# Patient Record
Sex: FEMALE | Race: AMERICAN INDIAN OR ALASKA NATIVE
[De-identification: names, ages, dates, MRNs, and addresses within clinical notes are randomized per-mention and may not be internally consistent; named-entity substitution may affect disease eponyms.]

---

## 2017-11-21 ENCOUNTER — HOSPITAL ENCOUNTER (OUTPATIENT)
Dept: HOSPITAL 5 - TRG | Age: 42
Discharge: HOME | End: 2017-11-21
Attending: OBSTETRICS & GYNECOLOGY
Payer: COMMERCIAL

## 2017-11-21 VITALS — SYSTOLIC BLOOD PRESSURE: 122 MMHG | DIASTOLIC BLOOD PRESSURE: 79 MMHG

## 2017-11-21 DIAGNOSIS — Z3A.38: ICD-10-CM

## 2017-11-21 DIAGNOSIS — O09.523: Primary | ICD-10-CM

## 2017-11-21 LAB
BILIRUB UR QL STRIP: (no result)
BLOOD UR QL VISUAL: (no result)
HCT VFR BLD CALC: 29.3 % (ref 30.3–42.9)
HGB BLD-MCNC: 8.9 GM/DL (ref 10.1–14.3)
KETONES UR STRIP-MCNC: (no result) MG/DL
LEUKOCYTE ESTERASE UR QL STRIP: (no result)
MCH RBC QN AUTO: 19 PG (ref 28–32)
MCHC RBC AUTO-ENTMCNC: 30 % (ref 30–34)
MCV RBC AUTO: 62 FL (ref 79–97)
MUCOUS THREADS #/AREA URNS HPF: (no result) /HPF
NITRITE UR QL STRIP: (no result)
PH UR STRIP: 7 [PH] (ref 5–7)
PLATELET # BLD: 265 K/MM3 (ref 140–440)
PROT UR STRIP-MCNC: (no result) MG/DL
RBC # BLD AUTO: 4.73 M/MM3 (ref 3.65–5.03)
RBC #/AREA URNS HPF: 2 /HPF (ref 0–6)
URATE SERPL-MCNC: 4.2 MG/DL (ref 3.5–7.6)
UROBILINOGEN UR-MCNC: < 2 MG/DL (ref ?–2)
WBC # BLD AUTO: 7.5 K/MM3 (ref 4.5–11)
WBC #/AREA URNS HPF: 2 /HPF (ref 0–6)

## 2017-11-21 PROCEDURE — 84450 TRANSFERASE (AST) (SGOT): CPT

## 2017-11-21 PROCEDURE — 59025 FETAL NON-STRESS TEST: CPT

## 2017-11-21 PROCEDURE — 82565 ASSAY OF CREATININE: CPT

## 2017-11-21 PROCEDURE — 83615 LACTATE (LD) (LDH) ENZYME: CPT

## 2017-11-21 PROCEDURE — 84460 ALANINE AMINO (ALT) (SGPT): CPT

## 2017-11-21 PROCEDURE — 85027 COMPLETE CBC AUTOMATED: CPT

## 2017-11-21 PROCEDURE — 81001 URINALYSIS AUTO W/SCOPE: CPT

## 2017-11-21 PROCEDURE — 36415 COLL VENOUS BLD VENIPUNCTURE: CPT

## 2017-11-21 PROCEDURE — 84550 ASSAY OF BLOOD/URIC ACID: CPT

## 2017-12-04 ENCOUNTER — HOSPITAL ENCOUNTER (OUTPATIENT)
Dept: HOSPITAL 5 - TRG | Age: 42
Discharge: HOME | End: 2017-12-04
Attending: OBSTETRICS & GYNECOLOGY
Payer: COMMERCIAL

## 2017-12-04 VITALS — DIASTOLIC BLOOD PRESSURE: 73 MMHG | SYSTOLIC BLOOD PRESSURE: 120 MMHG

## 2017-12-04 DIAGNOSIS — O48.0: ICD-10-CM

## 2017-12-04 DIAGNOSIS — Z3A.40: ICD-10-CM

## 2017-12-04 DIAGNOSIS — O09.523: Primary | ICD-10-CM

## 2017-12-05 ENCOUNTER — HOSPITAL ENCOUNTER (INPATIENT)
Dept: HOSPITAL 5 - APU | Age: 42
LOS: 2 days | Discharge: HOME | End: 2017-12-07
Attending: OBSTETRICS & GYNECOLOGY | Admitting: OBSTETRICS & GYNECOLOGY
Payer: COMMERCIAL

## 2017-12-05 DIAGNOSIS — Z23: ICD-10-CM

## 2017-12-05 DIAGNOSIS — D25.9: ICD-10-CM

## 2017-12-05 DIAGNOSIS — D62: ICD-10-CM

## 2017-12-05 DIAGNOSIS — Z3A.40: ICD-10-CM

## 2017-12-05 DIAGNOSIS — O34.211: Primary | ICD-10-CM

## 2017-12-05 DIAGNOSIS — O34.13: ICD-10-CM

## 2017-12-05 LAB
BASOPHILS NFR BLD AUTO: 0.5 % (ref 0–1.8)
EOSINOPHIL NFR BLD AUTO: 0.7 % (ref 0–4.3)
HCT VFR BLD CALC: 30 % (ref 30.3–42.9)
HGB BLD-MCNC: 9 GM/DL (ref 10.1–14.3)
MCH RBC QN AUTO: 19 PG (ref 28–32)
MCHC RBC AUTO-ENTMCNC: 30 % (ref 30–34)
MCV RBC AUTO: 62 FL (ref 79–97)
PLATELET # BLD: 246 K/MM3 (ref 140–440)
RBC # BLD AUTO: 4.84 M/MM3 (ref 3.65–5.03)
WBC # BLD AUTO: 8.2 K/MM3 (ref 4.5–11)

## 2017-12-05 PROCEDURE — 90471 IMMUNIZATION ADMIN: CPT

## 2017-12-05 PROCEDURE — 59025 FETAL NON-STRESS TEST: CPT

## 2017-12-05 PROCEDURE — 86900 BLOOD TYPING SEROLOGIC ABO: CPT

## 2017-12-05 PROCEDURE — 99211 OFF/OP EST MAY X REQ PHY/QHP: CPT

## 2017-12-05 PROCEDURE — G0463 HOSPITAL OUTPT CLINIC VISIT: HCPCS

## 2017-12-05 PROCEDURE — 36415 COLL VENOUS BLD VENIPUNCTURE: CPT

## 2017-12-05 PROCEDURE — 90715 TDAP VACCINE 7 YRS/> IM: CPT

## 2017-12-05 PROCEDURE — 85018 HEMOGLOBIN: CPT

## 2017-12-05 PROCEDURE — 86901 BLOOD TYPING SEROLOGIC RH(D): CPT

## 2017-12-05 PROCEDURE — 85014 HEMATOCRIT: CPT

## 2017-12-05 PROCEDURE — 86850 RBC ANTIBODY SCREEN: CPT

## 2017-12-05 PROCEDURE — 85025 COMPLETE CBC W/AUTO DIFF WBC: CPT

## 2017-12-05 PROCEDURE — 96360 HYDRATION IV INFUSION INIT: CPT

## 2017-12-05 PROCEDURE — C9250 ARTISS FIBRIN SEALANT: HCPCS

## 2017-12-05 RX ADMIN — CEFAZOLIN SCH MLS/10 MIN: 10 INJECTION, POWDER, FOR SOLUTION INTRAVENOUS at 17:35

## 2017-12-05 RX ADMIN — SODIUM CHLORIDE, SODIUM LACTATE, POTASSIUM CHLORIDE, AND CALCIUM CHLORIDE SCH MLS/HR: .6; .31; .03; .02 INJECTION, SOLUTION INTRAVENOUS at 11:42

## 2017-12-05 RX ADMIN — SODIUM CHLORIDE, SODIUM LACTATE, POTASSIUM CHLORIDE, AND CALCIUM CHLORIDE SCH MLS/HR: .6; .31; .03; .02 INJECTION, SOLUTION INTRAVENOUS at 10:52

## 2017-12-05 NOTE — HISTORY AND PHYSICAL REPORT
History of Present Illness


Date of examination: 17


Date of admission: 


17 09:48





Chief complaint: 





Repeat C Section with BTL


History of present illness: 





Pt is a 43yo BF  EDC 17;  EGA 40 3/7 weeks presents for a Repeat C 

Section with BTL.  She received prenatal care at [facility] since 

11 weeks and  course has been complicated by uterine fibroids for 

which she see's APA,and previous C Section x 1.  Prenatal records are available 

and GBS is Negative.





Past History


Past Medical History: no pertinent history


Past Surgical History:  section


GYN History: fibroids


Social history: no significant social history, 





- Obstetrical History


Expected Date of Delivery: 17


Actual Gestation: 40 Week(s) 3 Day(s) 


: 3





Medications and Allergies


 Allergies











Allergy/AdvReac Type Severity Reaction Status Date / Time


 


No Known Allergies Allergy   Verified 17 10:09











 Home Medications











 Medication  Instructions  Recorded  Confirmed  Last Taken  Type


 


No Known Home Medications [No  17 Unknown History





Reported Home Medications]     











Active Meds: 


Active Medications





Citric Acid/Sodium Citrate (Bicitra)  30 ml PO ONCE NR


   Stop: 17 19:00


   Last Admin: 17 11:43 Dose:  30 ml


Diphenhydramine HCl (Benadryl)  12.5 mg IV Q2H PRN


   PRN Reason: Itching


Famotidine (Pepcid)  20 mg IV ONCE NR


   Stop: 17 19:00


   Last Admin: 17 11:43 Dose:  20 mg


Cefazolin Sodium (Ancef/Sterile Water 2 Gm/20 Ml)  2 gm in 20 mls @ 80 mls/hr 

IV PREOP NR


   PRN Reason: Protocol


   Stop: 17 19:00


Lactated Ringer's (Lactated Ringers)  1,000 mls @ 2,250 mls/hr IV PREOP MILAN


   Stop: 17 11:27


   Last Admin: 17 11:42 Dose:  2,250 mls/hr


Oxytocin/Sodium Chloride (Pitocin/Ns 20 Unit/1000ml Drip)  20 units in 1,000 

mls @ 0 mls/hr IV TITR MILAN


   PRN Reason: As Directed


Ketorolac Tromethamine (Toradol)  30 mg IV Q6H PRN


   PRN Reason: Pain, Moderate (4-6)


   Stop: 12/10/17 10:52


Metoclopramide HCl (Reglan)  10 mg IV ONCE NR


   Stop: 17 19:00


   Last Admin: 17 11:43 Dose:  10 mg


Morphine Sulfate (Morphine)  2.5 mg IV Q15M PRN


   PRN Reason: Breakthrough Pain


Naloxone HCl (Narcan 0.4 Mg/1 Ml)  0.2 mg IV Q2MIN PRN


   PRN Reason: Res Rate </= 8 or 02 SAT < 92%


Ondansetron HCl (Zofran)  4 mg IV Q8H PRN


   PRN Reason: Nausea And Vomiting


Promethazine HCl (Phenergan)  25 mg AL Q6H PRN


   PRN Reason: Nausea And Vomiting


Sodium Chloride (Sodium Chloride Flush Syringe 10 Ml)  10 ml IV PRN MILAN











Review of Systems


All systems: negative





- Vital Signs


Vital signs: 


 Vital Signs











Temp Pulse Resp Pulse Ox


 


 98.2 F   87   20   99 


 


 17 10:00  17 10:00  17 10:00  17 10:00








 











Temp Pulse Resp BP Pulse Ox


 


 98.2 F   83   20   136/85   99 


 


 17 10:00  17 10:51  17 10:00  17 10:51  17 10:47














- Physical Exam


Breasts: Positive: deferred


Cardiovascular: Regular rate


Lungs: Positive: Clear to auscultation


Abdomen: Positive: normal appearance


Genitourinary (Female): Positive: normal external genitalia


Uterus: Positive: enlarged


Extremities: Positive: normal





- Obstetrical


FHR: category 1


Uterine Contraction Monitor Mode: External





Results


Result Diagrams: 


 17 10:00





 Abnormal lab results











  17 Range/Units





  10:00 


 


Hgb  9.0 L  (10.1-14.3)  gm/dl


 


Hct  30.0 L  (30.3-42.9)  %


 


MCV  62 L  (79-97)  fl


 


MCH  19 L  (28-32)  pg


 


RDW  22.1 H  (13.2-15.2)  %


 


Mono % (Auto)  8.6 H  (0.0-7.3)  %








All other labs normal.

## 2017-12-05 NOTE — ANESTHESIA CONSULTATION
Anesthesia Consult and Med Hx


Date of service: 12/05/17





- Airway


Anesthetic Teeth Evaluation: Good


ROM Head & Neck: Adequate


Mental/Hyoid Distance: Adequate


Mallampati Class: Class II


Intubation Access Assessment: Probably Good





- Pre-Operative Health Status


ASA Pre-Surgery Classification: ASA2


Proposed Anesthetic Plan: Epidural, Spinal





- Pulmonary


Hx Asthma: No


COPD: No


Hx Pneumonia: No





- Cardiovascular System


Hx Hypertension: No





- Central Nervous System


Hx Seizures: No


Hx Psychiatric Problems: No





- Endocrine


Hx Renal Disease: No


Hx End Stage Renal Disease: No


Hx Hypothyroidism: No


Hx Hyperthyroidism: No





- Hematic


Hx Anemia: Yes


Hx Sickle Cell Disease: No





- Other Systems


Hx Alcohol Use: No

## 2017-12-05 NOTE — OPERATIVE REPORT
Operative Report


Operative Report: 


Date of procedure: 2017





Pre-operative diagnosis: 1.  Intrauterine pregnancy at 40-3/7 weeks   2.  

Previous    3.  Advanced maternal age   4.  Uterine fibroids





Post-operative diagnosis: Same





Procedure name(s): Repeat low transverse  section





Surgeon: Keith Trinidad MD





Assistant: None





Anesthesia: Spinal anesthesia by Dr. Paredes





EBL: 800 mL's





Findings: A 4208 g male infant Apgars 8 at 1 minute 9 at 5 minutes.  Clear 

amniotic fluid.  Normal uterus with uterine fibroids.  Normal tubes and ovaries 

bilaterally.





Procedure: After the patient was prepped and draped in usual sterile fashion, 

and after satisfactory level of epidural anesthesia was obtained, the skin 

knife was used to make a transverse skin incision through the previous skin 

scar.  The incision was excised down to layer of the fascia, which was nicked 

in the midline and extended laterally using the Bovie cautery.  The rectus 

muscles were dissected off the rectus fascia both superiorly and inferiorly.  

The rectus bellies  in the midline, and the peritoneum was entered 

under direct visualization.  The peritoneal incision was extended superiorly 

and inferiorly.  A bladder flap was created and the bladder blade was then 

placed.  The uterus was scored in a curvilinear linear fashion, entered in the 

midline revealing clear amniotic fluid.  The infant's head was delivered onto 

the surgical field, and the oropharynx and nasopharynx were bulb suctioned.  

The rest of the infant's body was delivered, cord was doubly clamped and cut 

and the infant was handed to the waiting respiratory team.  The placenta was 

manually removed from the uterus, and the uterus removed from its normal 

anatomical position.  After gentle uterine lavage, the incision was inspected 

and found to be without extensions.  It was then closed in 2 layers using 0 

Vicryl suture in a running interlocking fashion, the second layer imbricating 

the first.  After good hemostasis was achieved, copious amounts or irrigation 

was performed, and the gutters were suctioned free of blood and blood clots.  

The Tisseel sealant was sprayed across the uterine incision.  The uterus was 

then returned to its normal anatomical position, and after excellent hemostasis 

assured, the peritoneum was re-approximated using 3-0 Vicryl suture in a 

running interlocking fashion, and then the rectus muscles were re-approximated 

using 3-0 Vicryl suture in a figure-of-eight configuration.  The fascia was 

then re-approximated using 0 Vicryl suture in running interlocking fashion.  

The subcutaneous layer was made hemostatic using Bovie cautery, the Tisseel 

sealant was sprayed across the fascial incision and the skin edges re-

approximated using 4-0 Vicryl suture in a sub-cuticular fashion.  Patient 

tolerated the procedure well was transported to recovery in stable condition.

## 2017-12-06 LAB
HCT VFR BLD CALC: 25.4 % (ref 30.3–42.9)
HGB BLD-MCNC: 7.8 GM/DL (ref 10.1–14.3)

## 2017-12-06 PROCEDURE — 3E0234Z INTRODUCTION OF SERUM, TOXOID AND VACCINE INTO MUSCLE, PERCUTANEOUS APPROACH: ICD-10-PCS | Performed by: OBSTETRICS & GYNECOLOGY

## 2017-12-06 RX ADMIN — CEFAZOLIN SCH MLS/10 MIN: 10 INJECTION, POWDER, FOR SOLUTION INTRAVENOUS at 01:35

## 2017-12-06 RX ADMIN — FERROUS SULFATE TAB 325 MG (65 MG ELEMENTAL FE) SCH: 325 (65 FE) TAB at 10:00

## 2017-12-06 RX ADMIN — Medication SCH: at 10:00

## 2017-12-06 NOTE — PROGRESS NOTE
Assessment and Plan





- Patient Problems


(1) Status post repeat low transverse  section


Onset Date: 17   Current Visit: Yes   Status: Resolved   


Plan to address problem: 


A:  S/P Repeat C Section - POD #1  Doing well


      Acute blood loss anemia - stable





 P:  Continue RPOC


      Anticipate discharge in 24-48hrs








(2) Acute blood loss anemia


Onset Date: 17   Current Visit: Yes   Status: Resolved   





Subjective





- Subjective


Date of service: 17


Principal diagnosis: s/p Repeat C Section - POD #1


Interval history: 





Pt is feeling well without complaints, tolerating a liquid diet without nausea 

or vomiting.


Patient reports: appetite normal, voiding normally, pain well controlled, flatus

, ambulating normally


[place]: doing well, nursing well





Objective





- Vital Signs


Latest vital signs: 


 Vital Signs











  Temp Pulse Resp BP BP Pulse Ox


 


 17 04:45  98.5 F  114 H  20   137/79  94


 


 17 00:40  99.4 F  116 H  20   131/77  97


 


 17 14:53  97.9 F  91 H  18  117/72   97


 


 17 14:30  97.6 F     


 


 17 14:25   100 H  13  120/77   98


 


 17 14:19   109 H  14  140/82   98


 


 17 14:13   86  16  115/69   100


 


 17 14:07   80  13  112/67   97


 


 17 14:01   85  12  111/65   97


 


 17 14:00  97.5 F L  101 H  16  118/68  


 


 17 13:27  97.0 F L   16  108/58   98


 


 17 10:51   83   136/85  


 


 17 10:50   90   140/89  


 


 17 10:47   92 H     99


 


 17 10:42   88     99


 


 17 10:37   90     100


 


 17 10:32   91 H     100


 


 17 10:27   95 H     99


 


 17 10:22   89     99


 


 17 10:17   84     100


 


 17 10:12   87     99


 


 17 10:08   85   136/86  


 


 17 10:07   93 H     99


 


 17 10:00  98.2 F  87  20    99








 Intake and Output











 17





 22:59 06:59 14:59


 


Intake Total  240 


 


Output Total  950 


 


Balance  -710 


 


Intake:   


 


  Oral  240 


 


Output:   


 


  Urine  950 


 


    Indwelling Catheter  500 


 


    Void  450 


 


Other:   


 


  Total, Intake Amount  120 


 


  Total, Output Amount  450 














- Exam


Breasts: Present: deferred


Cardiovascular: Present: Regular rate


Lungs: Present: Clear to auscultation


Abdomen: Present: normal appearance


Uterus: Present: normal, firm, fundal height below umbilicus


Extremities: Present: normal


Incision: Present: normal, dry, intact, dressed





- Labs


Labs: 


 Abnormal lab results











  17 Range/Units





  10:00 01:06 


 


Hgb  9.0 L  7.8 L  (10.1-14.3)  gm/dl


 


Hct  30.0 L  25.4 L  (30.3-42.9)  %


 


MCV  62 L   (79-97)  fl


 


MCH  19 L   (28-32)  pg


 


RDW  22.1 H   (13.2-15.2)  %


 


Mono % (Auto)  8.6 H   (0.0-7.3)  %








 Laboratory Tests











  17





  10:00 10:00 01:06


 


WBC  8.2  


 


RBC  4.84  


 


Hgb  9.0 L   7.8 L


 


Hct  30.0 L   25.4 L


 


MCV  62 L  


 


MCH  19 L  


 


MCHC  30  


 


RDW  22.1 H  


 


Plt Count  246  


 


Lymph % (Auto)  28.5  


 


Mono % (Auto)  8.6 H  


 


Eos % (Auto)  0.7  


 


Baso % (Auto)  0.5  


 


Lymph #  2.3  


 


Mono #  0.7  


 


Eos #  0.1  


 


Baso #  0.0  


 


Seg Neutrophils %  61.7  


 


Seg Neutrophils #  5.1  


 


Blood Type   A POSITIVE 


 


Antibody Screen   Negative

## 2017-12-06 NOTE — PROGRESS NOTE
Subjective


Date of service: 17


Principal diagnosis: s/p Repeat C Section - POD #1


Interval history: 





1st POD after 


Patient is in the bed, comfortable. Pain is well controlled with pain meds. 

Ambulated well. No residual neurological deficit. No anesthesia complications








Objective





- Constitutional


Vitals: 


 Vital Signs - 12hr











  17





  04:44 04:45 08:31


 


Temperature  98.5 F 99.8 F H


 


Pulse Rate 116 H 114 H 111 H


 


Respiratory  20 18





Rate   


 


Blood Pressure 137/79  130/72


 


Blood Pressure  137/79 





[Left]   


 


O2 Sat by Pulse 94 94 97





Oximetry   














- Labs


CBC & Chem 7: 


 17 01:06





Labs: 


 Abnormal lab results











  17 Range/Units





  01:06 


 


Hgb  7.8 L  (10.1-14.3)  gm/dl


 


Hct  25.4 L  (30.3-42.9)  %

## 2017-12-07 VITALS — SYSTOLIC BLOOD PRESSURE: 128 MMHG | DIASTOLIC BLOOD PRESSURE: 76 MMHG

## 2017-12-07 RX ADMIN — Medication SCH EACH: at 12:40

## 2017-12-07 RX ADMIN — FERROUS SULFATE TAB 325 MG (65 MG ELEMENTAL FE) SCH MG: 325 (65 FE) TAB at 12:40

## 2017-12-07 NOTE — DISCHARGE SUMMARY
Providers





- Providers


Date of Admission: 


17 09:48





Date of discharge: 17


Attending physician: 


KATIE RAWLS





Primary care physician: 


SHARIF ROCA








Hospitalization


Reason for admission:  section, IUP at term


Delivery: 


Procedure:  section, repeat low transverse


Episiotomy: none


Laceration: none


Incision: normal, dry, intact


Other postpartum procedures: none


Postpartum complications: none


Discharge diagnosis: IUP at term delivered


 baby: male


Hospital course: 





Pt is a 41yo BF  EDC 17;  EGA 40 3/7 weeks who presented for a Repeat 

C Section.  She underwent an uncomplicated Repeat C Section which she tolerated 

well, and by POD #2 she was tolerating a reg diet without nausea or vomiting, 

ambulating and voiding without difficulty. She was therefore discharged to home 

on POD #2 in stable condition.


Condition at discharge: Good


Disposition: DC-01 TO HOME OR SELFCARE





- Discharge Diagnoses


(1) Status post repeat low transverse  section


Status: Resolved   





(2) Acute blood loss anemia


Status: Resolved   





Plan





- Discharge Medications


Prescriptions: 


Ferrous Sulfate [Feosol 325 MG tab] 325 mg PO BID #60 tablet


HYDROcodone/APAP 5-325 [[place] 5/325] 1 each PO Q6HR PRN #30 tablet


 PRN Reason: Pain


Ibuprofen [Motrin] 800 mg PO Q8HR PRN #30 tablet


 PRN Reason: Moder Pain Unrelieved By Norco


Prenatal Vit Calc,Iron,Folic [Prenatal Vitamins] 1 each PO DAILY #30 tablet





- Provider Discharge Summary


Activity: routine, no sex for 6 weeks, no heavy lifting 4 weeks, no strenuous 

exercise


Diet: routine


Instructions: routine


Additional instructions: 


[]  Smoking cessation referral if applicable(refer to patient education folder 

for contact #)


[]  Refer to [facility] Booklet








Call your doctor immediately for:


* Fever > 100.5


* Heavy vaginal bleeding ( >1 pad per hour)


* Severe persistent headache


* Shortness of breath


* Reddened, hot, painful area to leg or breast


* Drainage or odor from incision.





* Keep incision clean and dry at all times and follow doctor's instructions 

regarding bathing/showering











- Follow up plan


Follow up: 


SHARIF ROCA MD [Primary Care Provider] - 14 Days

## 2017-12-07 NOTE — PROGRESS NOTE
Assessment and Plan





- Patient Problems


(1) Status post repeat low transverse  section


Onset Date: 17   Current Visit: Yes   Status: Resolved   


Plan to address problem: 


A:  S/P Repeat C Section - POD #2  Doing well


      Acute blood loss anemia - stable





 P:  May go home today.








(2) Acute blood loss anemia


Onset Date: 17   Current Visit: Yes   Status: Resolved   





Subjective





- Subjective


Date of service: 17


Principal diagnosis: s/p Repeat C Section - POD #2


Interval history: 





Pt is feeling well without complaints, tolerating a reg diet without nausea or 

vomiting, ambulating and voiding without difficulty.


Patient reports: appetite normal, voiding normally, pain well controlled, flatus

, ambulating normally, no nauseated


[place]: doing well, nursing well





Objective





- Vital Signs


Latest vital signs: 


 Vital Signs











  Temp Pulse Resp BP Pulse Ox


 


 17 07:34  98.5 F  98 H  18  124/71 


 


 17 00:15  98.6 F  71  16  114/68 


 


 17 16:00  98.6 F  101 H  18  132/78  96








 Intake and Output











 17





 22:59 06:59 14:59


 


Intake Total  300 


 


Balance  300 


 


Intake:   


 


  Intake, Free Water  300 














- Exam


Breasts: Present: deferred


Cardiovascular: Present: Regular rate


Lungs: Present: Clear to auscultation


Abdomen: Present: normal appearance


Uterus: Present: normal, firm, fundal height below umbilicus


Extremities: Present: normal


Incision: Present: normal, dry, intact

## 2020-03-12 ENCOUNTER — HOSPITAL ENCOUNTER (INPATIENT)
Dept: HOSPITAL 5 - TRG | Age: 45
LOS: 3 days | Discharge: HOME | End: 2020-03-15
Attending: OBSTETRICS & GYNECOLOGY | Admitting: OBSTETRICS & GYNECOLOGY
Payer: COMMERCIAL

## 2020-03-12 DIAGNOSIS — O34.211: ICD-10-CM

## 2020-03-12 DIAGNOSIS — Z30.2: ICD-10-CM

## 2020-03-12 DIAGNOSIS — Z3A.38: ICD-10-CM

## 2020-03-12 DIAGNOSIS — D62: ICD-10-CM

## 2020-03-12 LAB
ALT SERPL-CCNC: 9 UNITS/L (ref 7–56)
BACTERIA #/AREA URNS HPF: (no result) /HPF
BILIRUB UR QL STRIP: (no result)
BLOOD UR QL VISUAL: (no result)
HCT VFR BLD CALC: 29.6 % (ref 30.3–42.9)
HGB BLD-MCNC: 9 GM/DL (ref 10.1–14.3)
MCHC RBC AUTO-ENTMCNC: 30 % (ref 30–34)
MCV RBC AUTO: 63 FL (ref 79–97)
MUCOUS THREADS #/AREA URNS HPF: (no result) /HPF
PH UR STRIP: 6 [PH] (ref 5–7)
PLATELET # BLD: 281 K/MM3 (ref 140–440)
RBC # BLD AUTO: 4.71 M/MM3 (ref 3.65–5.03)
RBC #/AREA URNS HPF: 3 /HPF (ref 0–6)
URATE SERPL-MCNC: 4.1 MG/DL (ref 3.5–7.6)
UROBILINOGEN UR-MCNC: < 2 MG/DL (ref ?–2)
WBC #/AREA URNS HPF: 1 /HPF (ref 0–6)

## 2020-03-12 PROCEDURE — 88302 TISSUE EXAM BY PATHOLOGIST: CPT

## 2020-03-12 PROCEDURE — 0UB70ZZ EXCISION OF BILATERAL FALLOPIAN TUBES, OPEN APPROACH: ICD-10-PCS | Performed by: OBSTETRICS & GYNECOLOGY

## 2020-03-12 PROCEDURE — 36415 COLL VENOUS BLD VENIPUNCTURE: CPT

## 2020-03-12 PROCEDURE — 82565 ASSAY OF CREATININE: CPT

## 2020-03-12 PROCEDURE — 85014 HEMATOCRIT: CPT

## 2020-03-12 PROCEDURE — 85027 COMPLETE CBC AUTOMATED: CPT

## 2020-03-12 PROCEDURE — 86850 RBC ANTIBODY SCREEN: CPT

## 2020-03-12 PROCEDURE — 84460 ALANINE AMINO (ALT) (SGPT): CPT

## 2020-03-12 PROCEDURE — 86901 BLOOD TYPING SEROLOGIC RH(D): CPT

## 2020-03-12 PROCEDURE — 84550 ASSAY OF BLOOD/URIC ACID: CPT

## 2020-03-12 PROCEDURE — 81001 URINALYSIS AUTO W/SCOPE: CPT

## 2020-03-12 PROCEDURE — 85018 HEMOGLOBIN: CPT

## 2020-03-12 PROCEDURE — 83615 LACTATE (LD) (LDH) ENZYME: CPT

## 2020-03-12 PROCEDURE — 84450 TRANSFERASE (AST) (SGOT): CPT

## 2020-03-12 PROCEDURE — 86900 BLOOD TYPING SEROLOGIC ABO: CPT

## 2020-03-12 NOTE — PROCEDURE NOTE
OB Delivery Note





- Delivery


Date of Delivery: 20


Surgeon: SERENITY CLARK


Estimated blood loss: 500cc





-  Section


Preop diagnosis: repeat , desires sterilization


Postop diagnosis: same


 section procedure: repeat low transverse, bilateral tubal ligation


Disposition: PACU


Complications: none


Narrative: 





see op report





- Infant


  ** A


Apgar at 1 minute: 5


Apgar at 5 minutes: 9


Infant Gender: Male (3213 g/ 7 pounds 1 ounce)

## 2020-03-12 NOTE — ANESTHESIA CONSULTATION
Anesthesia Consult and Med Hx


Date of service: 03/12/20





- Airway


Anesthetic Teeth Evaluation: Good


ROM Head & Neck: Adequate


Mental/Hyoid Distance: Adequate


Mallampati Class: Class II


Intubation Access Assessment: Probably Good





- Pulmonary Exam


CTA: Yes





- Cardiac Exam


Cardiac Exam: RRR





- Pre-Operative Health Status


ASA Pre-Surgery Classification: ASA3


Proposed Anesthetic Plan: Spinal





- Pulmonary


Hx Asthma: No


COPD: No


Hx Pneumonia: No





- Cardiovascular System


Hx Hypertension: Yes (Pre-eclampsia)





- Central Nervous System


Hx Seizures: No


Hx Psychiatric Problems: No





- Endocrine


Hx Renal Disease: No


Hx End Stage Renal Disease: No


Hx Hypothyroidism: No


Hx Hyperthyroidism: No





- Hematic


Hx Anemia: Yes


Hx Sickle Cell Disease: No





- Other Systems


Hx Alcohol Use: No

## 2020-03-12 NOTE — HISTORY AND PHYSICAL REPORT
History of Present Illness


Date of examination: 20


Date of admission: 


20 16:43





Chief complaint: 





I'm here for a 





Past History





- Obstetrical History


: 3





Medications and Allergies


                                    Allergies











Allergy/AdvReac Type Severity Reaction Status Date / Time


 


No Known Allergies Allergy   Verified 17 10:09











                                Home Medications











 Medication  Instructions  Recorded  Confirmed  Last Taken  Type


 


Ferrous Sulfate [Feosol 325 MG tab] 325 mg PO BID #60 tablet 17  Unknown 

Rx


 


HYDROcodone/APAP 5-325 [[place] 1 each PO Q6HR PRN #30 tablet 17  Unknown Rx





5/325]     


 


Ibuprofen [Motrin] 800 mg PO Q8HR PRN #30 tablet 17  Unknown Rx


 


Prenatal Vit Calc,Iron,Folic 1 each PO DAILY #30 tablet 17  Unknown Rx





[Prenatal Vitamins]     











Active Meds: 


Active Medications





Oxytocin/Sodium Chloride (Pitocin/Ns 20 Unit/1000ml Drip)  20 units in 1,000 mls

@ 0 mls/hr IV TITR MILAN


Lactated Ringer's (Lactated Ringers)  1,000 mls @ 2,250 mls/hr IV PREOP MILAN


   Stop: 20 20:27


   Last Admin: 20 20:08 Dose:  2,250 mls/hr


   Documented by: 


Cefazolin Sodium (Ancef/Sterile Water 2 Gm/20 Ml)  2 gm in 20 mls @ 80 mls/hr IV

PREOP NR; Protocol


   Stop: 20 23:59


   Last Admin: 20 20:08 Dose:  80 mls/hr


   Documented by: 











- Vital Signs


Vital signs: 


                                   Vital Signs











Temp Pulse Resp BP Pulse Ox


 


 98.4 F   101 H  20   152/81   96 


 


 20 17:01  20 17:01  20 17:01  20 17:01  20 17:01








                                        











Temp Pulse Resp BP Pulse Ox


 


 98.2 F   102 H  16   138/81   99 


 


 20 19:14  20 20:35  20 19:14  20 20:22  20 20:35














- Physical Exam


Breasts: Positive: normal


Cardiovascular: Regular rate, Normal S1, Normal S2


Lungs: Positive: Clear to auscultation, Normal air movement


Abdomen: Positive: normal appearance, soft, normal bowel sounds


Genitourinary (Female): Positive: normal external genitalia, normal perenium


Vulva: both: normal


Vagina: Positive: normal moisture


Uterus: Positive: enlarged





- Obstetrical


FHR: auscultation normal





Results


Result Diagrams: 


                                 20 17:15





                                 20 17:15


                              Abnormal lab results











  20 Range/Units





  17:15 17:15 


 


Hgb  9.0 L   (10.1-14.3)  gm/dl


 


Hct  29.6 L   (30.3-42.9)  %


 


MCV  63 L   (79-97)  fl


 


MCH  19 L   (28-32)  pg


 


RDW  20.2 H   (13.2-15.2)  %


 


Creatinine   0.5 L  (0.7-1.2)  mg/dL


 


Lactate Dehydrogenase   261 H  ()  units/L








All other labs normal.

## 2020-03-12 NOTE — OPERATIVE REPORT
Operative Report


Operative Report: 





Preoperative diagnosis: Intrauterine pregnancy at 38 and 6   weeks


                               2.  Gestational hypertension at term


                  3.  Previous  x2    


                  4.  Undesired fertility





Postoperative diagnosis: Same 





Procedure: Repeat low transverse  section, Bilateral tubal ligation





Surgeon: Dr. Izabella Justin





EBL: 500 cc


Urine output: 200 mL


IV fluids: 1100 cc    mL


Findings: Viable male  in the vertex position.     Weight 7 lbs. 1 oz. 

3682 g Apgars 8 and 9.  Otherwise normal pelvic anatomy


Specimens: Portion of right and left fallopian tube


Complications: None





Procedure: The patient was admitted to the OR with IV running and in place.  She

was properly identified as herself.  She was given spinal anesthesia in the OR 

without difficulty.   She was placed in the dorsal supine position with a 

leftward tilt.  A Chavis catheter was inserted.  She was then prepped and draped 

in the normal sterile fashion.  An Allis test was used to confirm adequate 

anesthesia.  Once confirmed, the incision was made with the scalpel and carried 

to the underlying fascia using the scalpel and the Bovie.  The fascia was 

incised in the midline and incision was extended bilaterally using the curved 

Johnson scissors.  The fascia was then dissected from the underlying rectus muscles

in a series of sharp and blunt dissection using the Johnson scissors.  Muscles were

 in the in the midline sharply using Metzenbaum scissors and the 

peritoneum was entered into bluntly using the surgeon's fingers.  A bladder 

blade was then placed into the incision to protect the bladder.  Following this 

the bladder flap was created.  Hysterotomy incision was then made in the scal

pel.  Upon uterine entry, the amniotic sac was ruptured for clear fluid.  The 

infant was then delivered without difficulty..  His mouth and nose were 

suctioned on the field.  The cord was clamped and cut and he was handed to the 

waiting NICU personnel.


The uterus  was then exteriorized and cleared of all clots and debris.  The 

hysterotomy incision was then closed in a running locked fashion using 0 Vicryl.

 The abdomen was then copiously irrigated with warm normal saline.  Attention 

was turned the the fallopian tubes. Each tube was identified and followed out 

the the fimbriated end. Each tube was grasped in the midportion and ligated in 

the [place] style Tubal ligation.  Following this the uterus was replaced into 

the abdominal cavity.  At this point the muscles were reapproximated in the 

midline using individual sutures of 0 Vicryl.  Following this the fascia was 

closed in a running fashion using 0 Vicryl.  Tissue was then copiously 

irrigated. Retention sutures were placed in the subcutaneous fat tissue  Skin 

was closed in a running fashion using 3-0 Monocryl.  The sponge lap needle and 

instrument counts were correct 2.  The patient tolerated the procedure well.  

She was taken to recovery in stable condition.

## 2020-03-13 LAB
HCT VFR BLD CALC: 24.7 % (ref 30.3–42.9)
HGB BLD-MCNC: 7.4 GM/DL (ref 10.1–14.3)

## 2020-03-13 RX ADMIN — KETOROLAC TROMETHAMINE PRN MG: 30 INJECTION, SOLUTION INTRAMUSCULAR at 16:18

## 2020-03-13 RX ADMIN — KETOROLAC TROMETHAMINE PRN MG: 30 INJECTION, SOLUTION INTRAMUSCULAR at 03:22

## 2020-03-13 RX ADMIN — OXYCODONE AND ACETAMINOPHEN PRN TAB: 5; 325 TABLET ORAL at 23:11

## 2020-03-13 RX ADMIN — OXYCODONE AND ACETAMINOPHEN PRN TAB: 5; 325 TABLET ORAL at 04:38

## 2020-03-13 RX ADMIN — Medication SCH EACH: at 09:48

## 2020-03-13 RX ADMIN — FERROUS SULFATE TAB 325 MG (65 MG ELEMENTAL FE) SCH MG: 325 (65 FE) TAB at 09:48

## 2020-03-14 RX ADMIN — FERROUS SULFATE TAB 325 MG (65 MG ELEMENTAL FE) SCH MG: 325 (65 FE) TAB at 10:38

## 2020-03-14 RX ADMIN — Medication SCH MG: at 13:07

## 2020-03-14 RX ADMIN — Medication SCH EACH: at 10:38

## 2020-03-14 RX ADMIN — IBUPROFEN PRN MG: 800 TABLET, FILM COATED ORAL at 06:46

## 2020-03-14 NOTE — PROGRESS NOTE
Assessment and Plan


A:


POD2 s/p repeat LTCS and BTL


Gestational hypertension, BP labile mild range to normotensive


Acute on chronic anemia due to pregnancy and blood loss


Afebrile





P:


Routine pp care


Liquid Fe per pt preference


Monitor BP closely, initiate antihypertensives if consistently elevated


Anticipate d/c to home on POD3





Subjective





- Subjective


Date of service: 20


Principal diagnosis: s/p repeat c/s and BTL


Interval history: 


POD2 s/p repeat LTCS and BTL. Gestational hypertension


Patient reports: appetite normal, voiding normally, pain well controlled, 

flatus, ambulating normally


[place]: doing well, nursing well, bottle feeding (both)





Objective





- Vital Signs


Latest vital signs: 


                                   Vital Signs











  Temp Pulse Resp BP BP Pulse Ox


 


 20 07:21  99.2 F  88  20  151/84   98


 


 20 00:37  98.5 F  95 H  20  134/78   94


 


 20 20:40  98.3 F  86  18  137/86   99


 


 20 16:34  98.9 F  92 H  18   141/89  98


 


 20 11:40  98.2 F  87  18   139/84  98








                                Intake and Output











 20





 23:59 07:59 15:59


 


Intake Total 1080  


 


Output Total 500  


 


Balance 580  


 


Intake:   


 


  Oral 480  


 


  Intake, Free Water 600  


 


Output:   


 


  Urine 500  


 


    Void 500  


 


Other:   


 


  Total, Intake Amount 480  


 


  Total, Output Amount 500  


 


  # Voids   


 


    Void 2  














- Exam


Lungs: Present: Normal air movement


Abdomen: Present: soft


Uterus: Present: firm, fundal height below umbilicus.  Absent: bogginess


Extremities: Present: normal


Incision: Present: normal, dry, intact

## 2020-03-15 VITALS — SYSTOLIC BLOOD PRESSURE: 147 MMHG | DIASTOLIC BLOOD PRESSURE: 80 MMHG

## 2020-03-15 RX ADMIN — OXYCODONE AND ACETAMINOPHEN PRN TAB: 5; 325 TABLET ORAL at 14:00

## 2020-03-15 RX ADMIN — Medication SCH MG: at 14:00

## 2020-03-15 RX ADMIN — Medication SCH EACH: at 14:00

## 2020-03-15 RX ADMIN — IBUPROFEN PRN MG: 800 TABLET, FILM COATED ORAL at 14:00

## 2020-03-15 NOTE — DISCHARGE SUMMARY
Providers





- Providers


Date of Admission: 


20 16:43





Date of discharge: 03/15/20


Attending physician: 


SERENITY CLARK





Primary care physician: 


SERENITY CLARK








Hospitalization


Reason for admission:  section, IUP at term


Delivery: 


Procedure: bilateral tubal ligation, repeat low transverse


Episiotomy: none


Laceration: none


Incision: normal, dry, intact


Postpartum complications: other (anemia)


Discharge diagnosis: IUP at term delivered


Hospital course: 


Pt presented for repeat c/s and BTL and was diagnosed with gestational 

hypertension. Postpartum course complicated by anemia and mild range BP. Met 

discharge criteria on POD3.


Condition at discharge: Good


Disposition: DC-01 TO HOME OR SELFCARE





Plan





- Discharge Medications


Prescriptions: 


Ferrous Sulfate [Ferrous Sulfate Oral Liq 300 Mg/5 Ml] 300 mg PO BID #30 ml


labetaloL [Labetalol 100mg TAB] 100 mg PO BID #60 tablet


Ibuprofen [Motrin] 800 mg PO Q8HR PRN #30 tablet


 PRN Reason: Pain, Moderate (4-6)


oxyCODONE /ACETAMINOPHEN [Percocet 5/325] 1 tab PO Q6HR PRN #40 tablet


 PRN Reason: Pain





- Provider Discharge Summary


Activity: routine, no sex for 6 weeks, no heavy lifting 4 weeks, no strenuous 

exercise


Diet: routine


Instructions: routine


Additional instructions: 


[]  Smoking cessation referral if applicable(refer to patient education folder 

for contact #)


[]  Refer to [facility] Booklet








Call your doctor immediately for:


* Fever > 100.5


* Heavy vaginal bleeding ( >1 pad per hour)


* Severe persistent headache


* Shortness of breath


* Reddened, hot, painful area to leg or breast


* Drainage or odor from incision.





* Keep incision clean and dry at all times and follow doctor's instructions 

regarding bathing/showering











- Follow up plan


Follow up: 


SERENITY CLARK MD [Primary Care Provider] - 7 Days (Please call office to 

schedule appointment.)

## 2020-03-15 NOTE — PROGRESS NOTE
Assessment and Plan


A:


POD3 s/p repeat LTCS and BTL


Gestational hypertension, BP labile mild range to normotensive


Acute on chronic anemia due to pregnancy and blood loss


Afebrile





P:


Routine pp care


Liquid Fe per pt preference


Initiate Labetalol


Discharge to home today





Subjective





- Subjective


Date of service: 03/15/20


Principal diagnosis: s/p repeat c/s and BTL


Interval history: 


POD3 s/p repeat LTCS and BTL. Gestational hypertension


Patient reports: appetite normal, voiding normally, pain well controlled, 

flatus, ambulating normally


: doing well





Objective





- Vital Signs


Latest vital signs: 


                                   Vital Signs











  Temp Pulse Resp BP Pulse Ox


 


 03/15/20 07:44  98.7 F  97 H  18  139/76  98


 


 03/15/20 00:35  99.0 F  88  18  140/83  98








                                Intake and Output











 03/14/20 03/15/20 03/15/20





 23:59 07:59 15:59


 


Intake Total 480  240


 


Balance 480  240


 


Intake:   


 


  Oral 480  240


 


Other:   


 


  Total, Intake Amount 480  240


 


  # Voids   


 


    Void 2  1














- Exam


Lungs: Present: Normal air movement


Abdomen: Present: soft.  Absent: distention


Uterus: Present: firm, fundal height below umbilicus.  Absent: bogginess


Extremities: Present: normal


Incision: Present: normal, dry, intact